# Patient Record
Sex: FEMALE
[De-identification: names, ages, dates, MRNs, and addresses within clinical notes are randomized per-mention and may not be internally consistent; named-entity substitution may affect disease eponyms.]

---

## 2023-06-12 PROBLEM — Z00.00 ENCOUNTER FOR PREVENTIVE HEALTH EXAMINATION: Status: ACTIVE | Noted: 2023-06-12

## 2023-06-13 ENCOUNTER — APPOINTMENT (OUTPATIENT)
Dept: OTOLARYNGOLOGY | Facility: CLINIC | Age: 48
End: 2023-06-13
Payer: MEDICAID

## 2023-06-13 VITALS
HEART RATE: 82 BPM | HEIGHT: 67 IN | BODY MASS INDEX: 25.43 KG/M2 | SYSTOLIC BLOOD PRESSURE: 127 MMHG | DIASTOLIC BLOOD PRESSURE: 85 MMHG | TEMPERATURE: 96.7 F | WEIGHT: 162 LBS

## 2023-06-13 DIAGNOSIS — H91.93 UNSPECIFIED HEARING LOSS, BILATERAL: ICD-10-CM

## 2023-06-13 DIAGNOSIS — R68.89 OTHER GENERAL SYMPTOMS AND SIGNS: ICD-10-CM

## 2023-06-13 DIAGNOSIS — H93.11 TINNITUS, RIGHT EAR: ICD-10-CM

## 2023-06-13 PROCEDURE — 99203 OFFICE O/P NEW LOW 30 MIN: CPT

## 2023-06-13 NOTE — ASSESSMENT
[FreeTextEntry1] : 48 year old female presents for general ENT evaluation. Also with concern for some tinnitus intermittently on the right side.  Exam today was normal.     At this time I am recommending avoiding Q-tips.  I am also recommending audiogram and tympanogram and then follow-up after to review.\par \par –Q-tip avoidance\par – Audiogram and tympanogram\par – Follow-up after the above, sooner should symptoms worsen or fail to improve

## 2023-06-13 NOTE — HISTORY OF PRESENT ILLNESS
[de-identified] : 6/13/23\par 48F presents for general ear evaluation. She does report intermittent sensation of drainage in her right ear. + q tip use. Denies hearing loss, otalgia, otorrhea or vertiginous symptoms. She does note some intermittent static like tinnitus on the right side.  No issues on the left side.  No other ENT issues.